# Patient Record
Sex: FEMALE | Race: OTHER | Employment: PART TIME | URBAN - METROPOLITAN AREA
[De-identification: names, ages, dates, MRNs, and addresses within clinical notes are randomized per-mention and may not be internally consistent; named-entity substitution may affect disease eponyms.]

---

## 2023-03-18 ENCOUNTER — HOSPITAL ENCOUNTER (EMERGENCY)
Age: 33
Discharge: HOME | End: 2023-03-18
Attending: EMERGENCY MEDICINE
Payer: MEDICAID

## 2023-03-18 ENCOUNTER — APPOINTMENT (OUTPATIENT)
Dept: GENERAL RADIOLOGY | Age: 33
End: 2023-03-18
Attending: EMERGENCY MEDICINE
Payer: MEDICAID

## 2023-03-18 VITALS
RESPIRATION RATE: 16 BRPM | BODY MASS INDEX: 25.43 KG/M2 | TEMPERATURE: 98.7 F | SYSTOLIC BLOOD PRESSURE: 114 MMHG | HEIGHT: 67 IN | DIASTOLIC BLOOD PRESSURE: 68 MMHG | HEART RATE: 81 BPM | OXYGEN SATURATION: 100 % | WEIGHT: 162 LBS

## 2023-03-18 DIAGNOSIS — M54.50 ACUTE BILATERAL LOW BACK PAIN WITHOUT SCIATICA: ICD-10-CM
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
## 2023-03-18 LAB
APPEARANCE UR: CLEAR
BACTERIA URNS QL MICRO: ABNORMAL /HPF
BILIRUB UR QL: NEGATIVE
COLOR UR: YELLOW
EPITH CASTS URNS QL MICRO: ABNORMAL /LPF
GLUCOSE UR STRIP.AUTO-MCNC: NEGATIVE MG/DL
HGB UR QL STRIP: NEGATIVE
KETONES UR QL STRIP.AUTO: ABNORMAL MG/DL
LEUKOCYTE ESTERASE UR QL STRIP.AUTO: NEGATIVE
MUCOUS THREADS URNS QL MICRO: ABNORMAL /LPF
NITRITE UR QL STRIP.AUTO: NEGATIVE
PH UR STRIP: 5.5 (ref 5–8)
PROT UR STRIP-MCNC: ABNORMAL MG/DL
RBC #/AREA URNS HPF: ABNORMAL /HPF (ref 0–5)
SP GR UR REFRACTOMETRY: 1.03 (ref 1–1.03)
UR CULT HOLD, URHOLD: NORMAL
UROBILINOGEN UR QL STRIP.AUTO: 0.2 EU/DL (ref 0.2–1)
WBC URNS QL MICRO: ABNORMAL /HPF (ref 0–4)

## 2023-03-18 PROCEDURE — 81001 URINALYSIS AUTO W/SCOPE: CPT

## 2023-03-18 PROCEDURE — 72020 X-RAY EXAM OF SPINE 1 VIEW: CPT

## 2023-03-18 PROCEDURE — 99284 EMERGENCY DEPT VISIT MOD MDM: CPT

## 2023-03-19 NOTE — ED PROVIDER NOTES
Date of Service:  03/18/23      Patient:  Chey Harris    Chief Complaint:  Back Injury (25 week pregnant /fall down 3 steps)       HPI:  Chey Harris is a 27 y.o.  female who presents for evaluation of back pain. Patient had a mechanical slip when she missed the bottom step falling backwards hitting her back. No head strike or loss of consciousness. Patient complains of some low back pain with radiation to the bilateral lower extremities. No real weakness or numbness. No dysuria trouble urinating or trouble with her bowels. She also notes some suprapubic discomfort in the setting of a G1, P0 25-week pregnant patient. She states that she was feeling baby move throughout the day but once baby assessed 2. There is no abdominal trauma.   Otherwise patient denies complaints       Past Medical History:   Diagnosis Date    Trichilemmal cyst 9/10/2015       Past Surgical History:   Procedure Laterality Date    HX OTHER SURGICAL  9/17/15    Excised trichilemmal cysts x2 on scalp         Family History:   Problem Relation Age of Onset    No Known Problems Mother     No Known Problems Father        Social History     Socioeconomic History    Marital status: SINGLE     Spouse name: Not on file    Number of children: Not on file    Years of education: Not on file    Highest education level: Not on file   Occupational History    Not on file   Tobacco Use    Smoking status: Never    Smokeless tobacco: Never   Substance and Sexual Activity    Alcohol use: No    Drug use: No    Sexual activity: Never   Other Topics Concern    Not on file   Social History Narrative    Not on file     Social Determinants of Health     Financial Resource Strain: Not on file   Food Insecurity: Not on file   Transportation Needs: Not on file   Physical Activity: Not on file   Stress: Not on file   Social Connections: Not on file   Intimate Partner Violence: Not on file   Housing Stability: Not on file         ALLERGIES: Patient has no known allergies. Review of Systems    Vitals:    03/18/23 2054   BP: 114/68   Pulse: 81   Resp: 16   Temp: 98.7 °F (37.1 °C)   SpO2: 100%   Weight: 73.5 kg (162 lb)   Height: 5' 7\" (1.702 m)            Physical Exam  Vitals and nursing note reviewed. Exam conducted with a chaperone present. Constitutional:       Appearance: Normal appearance. Cardiovascular:      Rate and Rhythm: Normal rate. Pulmonary:      Effort: Pulmonary effort is normal.   Abdominal:      Comments: gravid   Musculoskeletal:         General: Tenderness (midline and paravertebral lumbar) present. No deformity. Skin:     Capillary Refill: Capillary refill takes less than 2 seconds. Neurological:      Mental Status: She is alert and oriented to person, place, and time. Motor: No weakness. Gait: Gait normal.   Psychiatric:         Mood and Affect: Mood normal.        Medical Decision Making  Amount and/or Complexity of Data Reviewed  Labs: ordered. Decision-making details documented in ED Course. Radiology: ordered. ED Course as of 03/19/23 0011   Sat Mar 18, 2023   2215 WBC: 5-10 [GG]   2215 Bacteria(!): 1+ [GG]   2215 Epithelial cells(!): MODERATE [GG]      ED Course User Index  [GG] Lyndsey Rushing DO     VITAL SIGNS:  Patient Vitals for the past 4 hrs:   Temp Pulse Resp BP SpO2   03/18/23 2054 98.7 °F (37.1 °C) 81 16 114/68 100 %           LABS:  The Following labs have been ordered while in the emergency department and I have independently evaluated them.      Recent Results (from the past 6 hour(s))   URINALYSIS W/MICROSCOPIC    Collection Time: 03/18/23  9:02 PM   Result Value Ref Range    Color YELLOW      Appearance CLEAR CLEAR      Specific gravity 1.030 1.003 - 1.030      pH (UA) 5.5 5.0 - 8.0      Protein TRACE (A) NEG mg/dL    Glucose Negative NEG mg/dL    Ketone TRACE (A) NEG mg/dL    Bilirubin Negative NEG      Blood Negative NEG      Urobilinogen 0.2 0.2 - 1.0 EU/dL    Nitrites Negative NEG      Leukocyte Esterase Negative NEG      WBC 5-10 0 - 4 /hpf    RBC 0-5 0 - 5 /hpf    Epithelial cells MODERATE (A) FEW /lpf    Bacteria 1+ (A) NEG /hpf    Mucus 3+ (A) NEG /lpf   URINE CULTURE HOLD SAMPLE    Collection Time: 03/18/23  9:02 PM    Specimen: Urine   Result Value Ref Range    Urine culture hold        Urine on hold in Microbiology dept for 2 days. If unpreserved urine is submitted, it cannot be used for addtional testing after 24 hours, recollection will be required. IMAGING:  The Following imaging studies have been ordered while in the emergency department and I have reviewed them. XR SPINE LUMB SNGL V   Final Result   Normal single lateral view of the lumbar spine. Medications During Visit:  I ordered/approved the ordering of the following medicines for the patient while in the emergency department. Medications - No data to display      DECISION MAKING:  Sara Myrick is a 27 y.o. female who comes in as above. Well-appearing patient. Fetal heart rate normal.  Patient consents for imaging, normal 1 view of the lumbar spine. .  She is well-appearing but is more concerned about baby than anything else. At this time she is well-appearing having no OB/GYN complaint we will discharge patient home with outpatient follow-up and return instructions      IMPRESSION:  1. Fall, initial encounter    2. Acute bilateral low back pain without sciatica        DISPOSITION:  Discharged      There are no discharge medications for this patient. Follow-up Information       Follow up With Specialties Details Why Contact Prabhu Interiano NP Family Medicine Schedule an appointment as soon as possible for a visit   [address] [address]  [phone]      Your OB/GYN  Schedule an appointment as soon as possible for a visit   Call              The patient is asked to follow-up with their primary care provider in the next several days.   They are to call tomorrow for an appointment. The patient is asked to return promptly for any increased concerns or worsening of symptoms. They can return to this emergency department or any other emergency department.     Procedures

## 2023-03-19 NOTE — ED TRIAGE NOTES
Triage note:slipped down last 3 steps at her home this morning injured her low back with pain down right leg. patient is 25 weeks pregnant. also having right lower abdominal pain.